# Patient Record
Sex: MALE | Race: WHITE | Employment: STUDENT | URBAN - METROPOLITAN AREA
[De-identification: names, ages, dates, MRNs, and addresses within clinical notes are randomized per-mention and may not be internally consistent; named-entity substitution may affect disease eponyms.]

---

## 2023-11-02 ENCOUNTER — HOSPITAL ENCOUNTER (INPATIENT)
Age: 43
LOS: 1 days | Discharge: HOME | DRG: 066 | End: 2023-11-03
Attending: STUDENT IN AN ORGANIZED HEALTH CARE EDUCATION/TRAINING PROGRAM | Admitting: INTERNAL MEDICINE
Payer: COMMERCIAL

## 2023-11-02 ENCOUNTER — APPOINTMENT (OUTPATIENT)
Dept: GENERAL RADIOLOGY | Age: 43
DRG: 066 | End: 2023-11-02
Payer: COMMERCIAL

## 2023-11-02 ENCOUNTER — APPOINTMENT (OUTPATIENT)
Dept: CT IMAGING | Age: 43
DRG: 066 | End: 2023-11-02
Attending: STUDENT IN AN ORGANIZED HEALTH CARE EDUCATION/TRAINING PROGRAM
Payer: COMMERCIAL

## 2023-11-02 DIAGNOSIS — R42 LIGHTHEADEDNESS: Primary | ICD-10-CM

## 2023-11-02 DIAGNOSIS — R47.9 SPEECH DISTURBANCE, UNSPECIFIED TYPE: ICD-10-CM

## 2023-11-02 PROBLEM — G45.9 TIA (TRANSIENT ISCHEMIC ATTACK): Status: ACTIVE | Noted: 2023-11-02

## 2023-11-02 LAB
ALBUMIN SERPL-MCNC: 4.6 G/DL (ref 3.4–5)
ALBUMIN/GLOB SERPL: 1.3 {RATIO} (ref 1.1–2.2)
ALP SERPL-CCNC: 95 U/L (ref 40–129)
ALT SERPL-CCNC: 30 U/L (ref 10–40)
AMPHETAMINES UR QL SCN>1000 NG/ML: NORMAL
ANION GAP SERPL CALCULATED.3IONS-SCNC: 12 MMOL/L (ref 3–16)
AST SERPL-CCNC: 23 U/L (ref 15–37)
BARBITURATES UR QL SCN>200 NG/ML: NORMAL
BASOPHILS # BLD: 0 K/UL (ref 0–0.2)
BASOPHILS NFR BLD: 0.4 %
BENZODIAZ UR QL SCN>200 NG/ML: NORMAL
BILIRUB SERPL-MCNC: 0.5 MG/DL (ref 0–1)
BUN SERPL-MCNC: 14 MG/DL (ref 7–20)
CALCIUM SERPL-MCNC: 9.8 MG/DL (ref 8.3–10.6)
CANNABINOIDS UR QL SCN>50 NG/ML: NORMAL
CHLORIDE SERPL-SCNC: 103 MMOL/L (ref 99–110)
CO2 SERPL-SCNC: 24 MMOL/L (ref 21–32)
COCAINE UR QL SCN: NORMAL
CREAT SERPL-MCNC: 1 MG/DL (ref 0.9–1.3)
DEPRECATED RDW RBC AUTO: 14.2 % (ref 12.4–15.4)
DRUG SCREEN COMMENT UR-IMP: NORMAL
EOSINOPHIL # BLD: 0.1 K/UL (ref 0–0.6)
EOSINOPHIL NFR BLD: 1.3 %
FENTANYL SCREEN, URINE: NORMAL
GFR SERPLBLD CREATININE-BSD FMLA CKD-EPI: >60 ML/MIN/{1.73_M2}
GLUCOSE SERPL-MCNC: 91 MG/DL (ref 70–99)
HCT VFR BLD AUTO: 44.4 % (ref 40.5–52.5)
HGB BLD-MCNC: 15.3 G/DL (ref 13.5–17.5)
LYMPHOCYTES # BLD: 2.3 K/UL (ref 1–5.1)
LYMPHOCYTES NFR BLD: 23.4 %
MCH RBC QN AUTO: 28 PG (ref 26–34)
MCHC RBC AUTO-ENTMCNC: 34.4 G/DL (ref 31–36)
MCV RBC AUTO: 81.4 FL (ref 80–100)
METHADONE UR QL SCN>300 NG/ML: NORMAL
MONOCYTES # BLD: 0.6 K/UL (ref 0–1.3)
MONOCYTES NFR BLD: 5.7 %
NEUTROPHILS # BLD: 6.8 K/UL (ref 1.7–7.7)
NEUTROPHILS NFR BLD: 69.2 %
OPIATES UR QL SCN>300 NG/ML: NORMAL
OXYCODONE UR QL SCN: NORMAL
PCP UR QL SCN>25 NG/ML: NORMAL
PH UR STRIP: 7 [PH]
PLATELET # BLD AUTO: 217 K/UL (ref 135–450)
PMV BLD AUTO: 9.1 FL (ref 5–10.5)
POTASSIUM SERPL-SCNC: 3.8 MMOL/L (ref 3.5–5.1)
PROT SERPL-MCNC: 8.1 G/DL (ref 6.4–8.2)
RBC # BLD AUTO: 5.46 M/UL (ref 4.2–5.9)
SODIUM SERPL-SCNC: 139 MMOL/L (ref 136–145)
TROPONIN, HIGH SENSITIVITY: 7 NG/L (ref 0–22)
WBC # BLD AUTO: 9.8 K/UL (ref 4–11)

## 2023-11-02 PROCEDURE — 80307 DRUG TEST PRSMV CHEM ANLYZR: CPT

## 2023-11-02 PROCEDURE — 6360000004 HC RX CONTRAST MEDICATION: Performed by: STUDENT IN AN ORGANIZED HEALTH CARE EDUCATION/TRAINING PROGRAM

## 2023-11-02 PROCEDURE — 80053 COMPREHEN METABOLIC PANEL: CPT

## 2023-11-02 PROCEDURE — 2580000003 HC RX 258: Performed by: HOSPITALIST

## 2023-11-02 PROCEDURE — 85025 COMPLETE CBC W/AUTO DIFF WBC: CPT

## 2023-11-02 PROCEDURE — G0378 HOSPITAL OBSERVATION PER HR: HCPCS

## 2023-11-02 PROCEDURE — 99285 EMERGENCY DEPT VISIT HI MDM: CPT

## 2023-11-02 PROCEDURE — 84484 ASSAY OF TROPONIN QUANT: CPT

## 2023-11-02 PROCEDURE — 70450 CT HEAD/BRAIN W/O DYE: CPT

## 2023-11-02 PROCEDURE — 6370000000 HC RX 637 (ALT 250 FOR IP): Performed by: HOSPITALIST

## 2023-11-02 PROCEDURE — 70498 CT ANGIOGRAPHY NECK: CPT

## 2023-11-02 PROCEDURE — 93005 ELECTROCARDIOGRAM TRACING: CPT | Performed by: EMERGENCY MEDICINE

## 2023-11-02 PROCEDURE — 71046 X-RAY EXAM CHEST 2 VIEWS: CPT

## 2023-11-02 RX ORDER — ONDANSETRON 4 MG/1
4 TABLET, ORALLY DISINTEGRATING ORAL EVERY 8 HOURS PRN
Status: DISCONTINUED | OUTPATIENT
Start: 2023-11-02 | End: 2023-11-03 | Stop reason: HOSPADM

## 2023-11-02 RX ORDER — ATORVASTATIN CALCIUM 40 MG/1
40 TABLET, FILM COATED ORAL NIGHTLY
Status: DISCONTINUED | OUTPATIENT
Start: 2023-11-02 | End: 2023-11-03 | Stop reason: HOSPADM

## 2023-11-02 RX ORDER — SODIUM CHLORIDE 0.9 % (FLUSH) 0.9 %
5-40 SYRINGE (ML) INJECTION EVERY 12 HOURS SCHEDULED
Status: DISCONTINUED | OUTPATIENT
Start: 2023-11-02 | End: 2023-11-03 | Stop reason: HOSPADM

## 2023-11-02 RX ORDER — ASPIRIN 81 MG/1
81 TABLET, CHEWABLE ORAL DAILY
Status: DISCONTINUED | OUTPATIENT
Start: 2023-11-03 | End: 2023-11-03 | Stop reason: HOSPADM

## 2023-11-02 RX ORDER — ASPIRIN 300 MG/1
300 SUPPOSITORY RECTAL DAILY
Status: DISCONTINUED | OUTPATIENT
Start: 2023-11-03 | End: 2023-11-03 | Stop reason: HOSPADM

## 2023-11-02 RX ORDER — POLYETHYLENE GLYCOL 3350 17 G/17G
17 POWDER, FOR SOLUTION ORAL DAILY PRN
Status: DISCONTINUED | OUTPATIENT
Start: 2023-11-02 | End: 2023-11-03 | Stop reason: HOSPADM

## 2023-11-02 RX ORDER — ONDANSETRON 2 MG/ML
4 INJECTION INTRAMUSCULAR; INTRAVENOUS EVERY 6 HOURS PRN
Status: DISCONTINUED | OUTPATIENT
Start: 2023-11-02 | End: 2023-11-03 | Stop reason: HOSPADM

## 2023-11-02 RX ORDER — SODIUM CHLORIDE 0.9 % (FLUSH) 0.9 %
5-40 SYRINGE (ML) INJECTION PRN
Status: DISCONTINUED | OUTPATIENT
Start: 2023-11-02 | End: 2023-11-03 | Stop reason: HOSPADM

## 2023-11-02 RX ORDER — SODIUM CHLORIDE 9 MG/ML
INJECTION, SOLUTION INTRAVENOUS PRN
Status: DISCONTINUED | OUTPATIENT
Start: 2023-11-02 | End: 2023-11-03 | Stop reason: HOSPADM

## 2023-11-02 RX ORDER — ENOXAPARIN SODIUM 100 MG/ML
30 INJECTION SUBCUTANEOUS 2 TIMES DAILY
Status: DISCONTINUED | OUTPATIENT
Start: 2023-11-02 | End: 2023-11-03 | Stop reason: HOSPADM

## 2023-11-02 RX ADMIN — ATORVASTATIN CALCIUM 40 MG: 40 TABLET, FILM COATED ORAL at 22:03

## 2023-11-02 RX ADMIN — IOPAMIDOL 75 ML: 755 INJECTION, SOLUTION INTRAVENOUS at 17:12

## 2023-11-02 RX ADMIN — Medication 10 ML: at 22:05

## 2023-11-02 NOTE — ED NOTES
Pt resting in bed at this time, laying in a supine position with head of bed elevated . Call light remains in reach instructed pt how to use, and encouraged pt to call if needed assistance, no distress noted. RR even and unlabored, skin warm and dry. No needs at this time. Will continue to monitor closely.        Cheryl Faye RN  11/02/23 [id number]

## 2023-11-02 NOTE — ED NOTES
Dr Loco Shrestha at bedside speaking with pt and pt's wife at this time      Goyo Rodriguez RN  11/02/23 [id number]

## 2023-11-02 NOTE — H&P
demonstrate no acute abnormality. SOFT TISSUES/SKULL:  Appearance of a large cephalohematoma right frontal region extending to the vertex. This is causing an abnormality of the soft tissues. This area extends directly to the temporalis muscle. Soft tissue fullness at the right frontal region at the vertex. No intracranial abnormality. XR CHEST (2 VW)    Result Date: 11/2/2023  EXAMINATION: TWO XRAY VIEWS OF THE CHEST 11/2/2023 5:25 pm COMPARISON: None. HISTORY: ORDERING SYSTEM PROVIDED HISTORY: lightheadness TECHNOLOGIST PROVIDED HISTORY: Reason for exam:->lightheadness Reason for Exam: lightheadness FINDINGS: Normal cardiomediastinal silhouette. No acute airspace infiltrate.   No pneumothorax or pleural effusion     No acute cardiopulmonary findings         Electronically signed by Diane Cosby MD on 11/2/2023 at 7:38 PM

## 2023-11-02 NOTE — ED NOTES
Pt resting in bed at this time, laying in a supine position with head of bed elevated . Call light remains in reach instructed pt how to use, and encouraged pt to call if needed assistance, no distress noted. RR even and unlabored, skin warm and dry. No needs at this time. Will continue to monitor closely.   Wife remains at bedside      Mattie Espino RN  11/02/23 7707

## 2023-11-02 NOTE — ED NOTES
Pt resting in bed with no signs of acute distress, RR easy and unlabored. Blankets applied, call light in reach, bed in lowest position, updated on plan of care, all questions answered.         Radha Ríos RN  11/02/23 3748

## 2023-11-03 ENCOUNTER — APPOINTMENT (OUTPATIENT)
Dept: MRI IMAGING | Age: 43
DRG: 066 | End: 2023-11-03
Payer: COMMERCIAL

## 2023-11-03 VITALS
HEIGHT: 74 IN | OXYGEN SATURATION: 93 % | HEART RATE: 60 BPM | TEMPERATURE: 98.4 F | WEIGHT: 290.79 LBS | BODY MASS INDEX: 37.32 KG/M2 | SYSTOLIC BLOOD PRESSURE: 121 MMHG | RESPIRATION RATE: 16 BRPM | DIASTOLIC BLOOD PRESSURE: 67 MMHG

## 2023-11-03 PROBLEM — I63.9 ACUTE CVA (CEREBROVASCULAR ACCIDENT) (HCC): Status: ACTIVE | Noted: 2023-11-03

## 2023-11-03 LAB
CHOLEST SERPL-MCNC: 189 MG/DL (ref 0–199)
DEPRECATED RDW RBC AUTO: 14.1 % (ref 12.4–15.4)
EKG ATRIAL RATE: 49 BPM
EKG DIAGNOSIS: NORMAL
EKG P AXIS: 25 DEGREES
EKG P-R INTERVAL: 182 MS
EKG Q-T INTERVAL: 418 MS
EKG QRS DURATION: 94 MS
EKG QTC CALCULATION (BAZETT): 377 MS
EKG R AXIS: 42 DEGREES
EKG T AXIS: 42 DEGREES
EKG VENTRICULAR RATE: 49 BPM
EST. AVERAGE GLUCOSE BLD GHB EST-MCNC: 114 MG/DL
HBA1C MFR BLD: 5.6 %
HCT VFR BLD AUTO: 44.2 % (ref 40.5–52.5)
HDLC SERPL-MCNC: 34 MG/DL (ref 40–60)
HGB BLD-MCNC: 15 G/DL (ref 13.5–17.5)
LDLC SERPL CALC-MCNC: 98 MG/DL
MCH RBC QN AUTO: 27.6 PG (ref 26–34)
MCHC RBC AUTO-ENTMCNC: 34 G/DL (ref 31–36)
MCV RBC AUTO: 81.3 FL (ref 80–100)
PLATELET # BLD AUTO: 201 K/UL (ref 135–450)
PMV BLD AUTO: 9.4 FL (ref 5–10.5)
RBC # BLD AUTO: 5.44 M/UL (ref 4.2–5.9)
TRIGL SERPL-MCNC: 286 MG/DL (ref 0–150)
VLDLC SERPL CALC-MCNC: 57 MG/DL
WBC # BLD AUTO: 8.5 K/UL (ref 4–11)

## 2023-11-03 PROCEDURE — 83036 HEMOGLOBIN GLYCOSYLATED A1C: CPT

## 2023-11-03 PROCEDURE — 6370000000 HC RX 637 (ALT 250 FOR IP): Performed by: HOSPITALIST

## 2023-11-03 PROCEDURE — 36415 COLL VENOUS BLD VENIPUNCTURE: CPT

## 2023-11-03 PROCEDURE — 2580000003 HC RX 258: Performed by: HOSPITALIST

## 2023-11-03 PROCEDURE — 93306 TTE W/DOPPLER COMPLETE: CPT

## 2023-11-03 PROCEDURE — 93010 ELECTROCARDIOGRAM REPORT: CPT | Performed by: INTERNAL MEDICINE

## 2023-11-03 PROCEDURE — 6370000000 HC RX 637 (ALT 250 FOR IP): Performed by: INTERNAL MEDICINE

## 2023-11-03 PROCEDURE — 70551 MRI BRAIN STEM W/O DYE: CPT

## 2023-11-03 PROCEDURE — 9990000010 HC NO CHARGE VISIT

## 2023-11-03 PROCEDURE — 85027 COMPLETE CBC AUTOMATED: CPT

## 2023-11-03 PROCEDURE — 80061 LIPID PANEL: CPT

## 2023-11-03 PROCEDURE — 94760 N-INVAS EAR/PLS OXIMETRY 1: CPT

## 2023-11-03 PROCEDURE — 2060000000 HC ICU INTERMEDIATE R&B

## 2023-11-03 RX ORDER — CLOPIDOGREL BISULFATE 75 MG/1
75 TABLET ORAL DAILY
Qty: 21 TABLET | Refills: 0 | Status: SHIPPED | OUTPATIENT
Start: 2023-11-03 | End: 2023-11-24

## 2023-11-03 RX ORDER — ASPIRIN 81 MG/1
81 TABLET ORAL DAILY
Qty: 90 TABLET | Refills: 1 | Status: SHIPPED | OUTPATIENT
Start: 2023-11-03

## 2023-11-03 RX ORDER — CLOPIDOGREL BISULFATE 75 MG/1
75 TABLET ORAL DAILY
Status: DISCONTINUED | OUTPATIENT
Start: 2023-11-03 | End: 2023-11-03 | Stop reason: HOSPADM

## 2023-11-03 RX ORDER — ATORVASTATIN CALCIUM 40 MG/1
40 TABLET, FILM COATED ORAL NIGHTLY
Qty: 30 TABLET | Refills: 3 | Status: SHIPPED | OUTPATIENT
Start: 2023-11-03

## 2023-11-03 RX ADMIN — Medication 10 ML: at 10:24

## 2023-11-03 RX ADMIN — CLOPIDOGREL BISULFATE 75 MG: 75 TABLET ORAL at 13:31

## 2023-11-03 RX ADMIN — ASPIRIN 81 MG: 81 TABLET, CHEWABLE ORAL at 10:24

## 2023-11-03 NOTE — ED NOTES
Report given to 5N AJAY Smith to assume care. All questions answered, SBAR discussed, RN verbalized understanding. Pt to be transported to floor by transport, no signs of acute distress noted a this time.       To Ibrahim RN  11/02/23 2017

## 2023-11-03 NOTE — ED PROVIDER NOTES
[facility] PROGRESSIVE C[facility]    CHIEF COMPLAINT  Dizziness (Pt states he has felt lightheaded and like he was going to pass out since waking today at 0500. Pt he has a difficult time walking and finding his words. Pt denies recent injury. Complains of nausea. Denies chest pain, denies shortness of breath. Last known well 0300 this am when he went to bathroom.  )       [address] Ivan is a 41 y.o. male presenting to the ED for lightheadedness. Patient states around 9 AM while he was at work he became unsteady. Patient states he felt lightheaded at the time. Patient also reports that he had difficulty finding words. Coworkers and significant other reports that patient had slurred speech. Patient stated he had slurred speech at the time. Patient was sent to a primary care doctor's office from his job to be evaluated. Due to his reported symptoms that primary care doctor sent him to the emergency department for further evaluation. On arrival patient still feels like his speech is slowed however he denies dysarthria. Patient denies syncope, falls, weakness, numbness. - History obtained from: Patient   - Limitations to history: none    I have reviewed the following from the nursing documentation:    Past Medical History:   Diagnosis Date    Non-smoker      No past surgical history on file. No family history on file.   Social History     Socioeconomic History    Marital status:      Spouse name: Not on file    Number of children: Not on file    Years of education: Not on file    Highest education level: Not on file   Occupational History    Not on file   Tobacco Use    Smoking status: Never    Smokeless tobacco: Never   Vaping Use    Vaping Use: Never used   Substance and Sexual Activity    Alcohol use: Not on file    Drug use: Never    Sexual activity: Yes     Partners: Female   Other Topics Concern    Not on file   Social History Narrative    Not on file     Social

## 2023-11-03 NOTE — PLAN OF CARE
Problem: Discharge Planning  Goal: Discharge to home or other facility with appropriate resources  Outcome: Progressing  Flowsheets (Taken 11/2/2023 2045)  Discharge to home or other facility with appropriate resources: Identify barriers to discharge with patient and caregiver     Problem: Safety - Adult  Goal: Free from fall injury  Outcome: Progressing     Problem: Neurosensory - Adult  Goal: Achieves stable or improved neurological status  Outcome: Progressing  Goal: Achieves maximal functionality and self care  Outcome: Progressing

## 2023-11-03 NOTE — CARE COORDINATION
NOEMI completed chart review, pt is up ad shawanda in room independently. Likely no DC needs, please consult NOEMI for needs, ANR.        Sven Brewer LMSW, [address] [facility] Social Work Case Management   Phone: [phone]  Fax: [phone]

## 2023-11-03 NOTE — CONSULTS
grossly intact   Attention intact as able to attend well to the exam     Language fluent in conversation   Comprehension intact; follows simple commands  Cranial nerves:   CN2: visual fields full   CN 3,4,6: extraocular muscles intact. Pupils are equal, round, reactive bilaterally. Has a bit of a dysconjugate gaze at baseline. CN5: facial sensation symmetric   CN7: face symmetric without dysarthria  CN8: hearing grossly intact  CN11: trap full strength on shoulder shrug  CN12: tongue midline with protrusion  Strength: good strength in all 4 extremities   Sensory: light touch intact in all 4 extremities. Cerebellar/coordination: finger nose finger normal without ataxia  Tone: normal in all 4 extremities  Gait: deferred for comfort. Labs  Glucose 91  Na 139  K 3.8  Cl 103  BUN 14  Cr 1.0  Ca 9.8    Cholesterol, Total 189   HDL Cholesterol 34 (L)   LDL Calculated 98   Triglycerides 286 (H)     HgA1c 5.6    ALT 30  AST 23    WBC 8.5K   Hg 15.3  Platelets 227    Drug screen negative    Studies  MRI brain w/o 11/3/23, independently reviewed  Questionable lacunar infarct involving the left paramedian hailey. Minimal chronic small vessel ischemic changes. CTA head/neck 11/2/23, independently reviewed  Unremarkable CTA of the head and neck. Prominence of the right temporalis muscle likely incidental.    TTE 11/3/23   Normal left ventricle size, wall thickness, and systolic function with an   estimated ejection fraction of 55-60%. No regional wall motion abnormalities are seen. Normal diastolic function. The right ventricle is normal in size and function. No significant valve abnormalities noted. Normal L atrium. A bubble study was performed and fails to show evidence of shunting. EKG 11/2/23  SB. HR 49. Impression/Recommendations  Imbalance. Transient speech impairment/L facial numbness. Near syncope? Hx migraines. It is difficult to say what was occurring yesterday.   There is a